# Patient Record
Sex: FEMALE | Race: WHITE
[De-identification: names, ages, dates, MRNs, and addresses within clinical notes are randomized per-mention and may not be internally consistent; named-entity substitution may affect disease eponyms.]

---

## 2019-01-28 ENCOUNTER — HOSPITAL ENCOUNTER (EMERGENCY)
Dept: HOSPITAL 25 - ED | Age: 34
Discharge: HOME | End: 2019-01-28
Payer: COMMERCIAL

## 2019-01-28 VITALS — DIASTOLIC BLOOD PRESSURE: 75 MMHG | SYSTOLIC BLOOD PRESSURE: 100 MMHG

## 2019-01-28 DIAGNOSIS — Z3A.16: ICD-10-CM

## 2019-01-28 DIAGNOSIS — R10.84: ICD-10-CM

## 2019-01-28 DIAGNOSIS — O23.42: Primary | ICD-10-CM

## 2019-01-28 LAB
ALBUMIN SERPL BCG-MCNC: 3.7 G/DL (ref 3.2–5.2)
ALBUMIN/GLOB SERPL: 1.3 {RATIO} (ref 1–3)
ALP SERPL-CCNC: 69 U/L (ref 34–104)
ALT SERPL W P-5'-P-CCNC: 17 U/L (ref 7–52)
ANION GAP SERPL CALC-SCNC: 8 MMOL/L (ref 2–11)
AST SERPL-CCNC: 19 U/L (ref 13–39)
BASOPHILS # BLD AUTO: 0.1 10^3/UL (ref 0–0.2)
BUN SERPL-MCNC: 9 MG/DL (ref 6–24)
BUN/CREAT SERPL: 14.5 (ref 8–20)
CALCIUM SERPL-MCNC: 9.2 MG/DL (ref 8.6–10.3)
CHLORIDE SERPL-SCNC: 104 MMOL/L (ref 101–111)
EOSINOPHIL # BLD AUTO: 0.1 10^3/UL (ref 0–0.6)
GLOBULIN SER CALC-MCNC: 2.8 G/DL (ref 2–4)
GLUCOSE SERPL-MCNC: 79 MG/DL (ref 70–100)
HCO3 SERPL-SCNC: 23 MMOL/L (ref 22–32)
HCT VFR BLD AUTO: 39 % (ref 35–47)
HGB BLD-MCNC: 12.8 G/DL (ref 12–16)
LYMPHOCYTES # BLD AUTO: 2.5 10^3/UL (ref 1–4.8)
MCH RBC QN AUTO: 29 PG (ref 27–31)
MCHC RBC AUTO-ENTMCNC: 33 G/DL (ref 31–36)
MCV RBC AUTO: 87 FL (ref 80–97)
MONOCYTES # BLD AUTO: 0.8 10^3/UL (ref 0–0.8)
NEUTROPHILS # BLD AUTO: 8.5 10^3/UL (ref 1.5–7.7)
NRBC # BLD AUTO: 0 10^3/UL
NRBC BLD QL AUTO: 0
PLATELET # BLD AUTO: 229 10^3/UL (ref 150–450)
POTASSIUM SERPL-SCNC: 3.5 MMOL/L (ref 3.5–5)
PROT SERPL-MCNC: 6.5 G/DL (ref 6.4–8.9)
RBC # BLD AUTO: 4.47 10^6/UL (ref 4–5.4)
RBC UR QL AUTO: (no result)
SODIUM SERPL-SCNC: 135 MMOL/L (ref 135–145)
VIT C UR QL: (no result)
WBC # BLD AUTO: 12 10^3/UL (ref 3.5–10.8)
WBC UR QL AUTO: (no result)

## 2019-01-28 PROCEDURE — 86140 C-REACTIVE PROTEIN: CPT

## 2019-01-28 PROCEDURE — 83690 ASSAY OF LIPASE: CPT

## 2019-01-28 PROCEDURE — 99283 EMERGENCY DEPT VISIT LOW MDM: CPT

## 2019-01-28 PROCEDURE — 85025 COMPLETE CBC W/AUTO DIFF WBC: CPT

## 2019-01-28 PROCEDURE — 76775 US EXAM ABDO BACK WALL LIM: CPT

## 2019-01-28 PROCEDURE — 76705 ECHO EXAM OF ABDOMEN: CPT

## 2019-01-28 PROCEDURE — 83605 ASSAY OF LACTIC ACID: CPT

## 2019-01-28 PROCEDURE — 76856 US EXAM PELVIC COMPLETE: CPT

## 2019-01-28 PROCEDURE — 81015 MICROSCOPIC EXAM OF URINE: CPT

## 2019-01-28 PROCEDURE — 36415 COLL VENOUS BLD VENIPUNCTURE: CPT

## 2019-01-28 PROCEDURE — 87086 URINE CULTURE/COLONY COUNT: CPT

## 2019-01-28 PROCEDURE — 80053 COMPREHEN METABOLIC PANEL: CPT

## 2019-01-28 PROCEDURE — 81003 URINALYSIS AUTO W/O SCOPE: CPT

## 2019-01-28 NOTE — ED
GI/ HPI





- HPI Summary


HPI Summary: 


Patient is a 32 y/o F presenting to ED with complaints of right flank pain and 

decreased frequency of urination onsetting yesterday. She denies hematuria, 

fever, and vaginal discharge. Patient is 16 weeks pregnant, she states that she 

has Hx of hydronephrosis when pregnant. She also has Hx of kidney stones, 

stents placed with previous pregnancies. This is her third time pregnant. 

Appendix is still present. On triage, pain is rated 7/10. Nothing is noted to 

aggravate/alleviate Sx. Home medications and allergies are reviewed. 








- History of Current Complaint


Chief Complaint: EDFlankPain


Time Seen by Provider: 01/28/19 20:32


Stated Complaint: 16WKS PREG/RIGHT FLANK PAIN


Hx Obtained From: Patient


Onset/Duration: Started Days Ago - yesterday, Still Present


Timing: Constant, Lasting Days - yesterday


Severity: Severe - 7/10


Current Severity: Severe - 7/10


Pain Intensity: 9


Location of Pain: Flank - right


Associated Signs and Symptoms: Positive: Flank Pain - RIGHT, Other: - POSITIVE 

- DECREASED FREQUENCY OF URINATION.  Negative: Discharge, Fever, Hematuria


Additional Signs & Symptoms: Negative: Vaginal Discharge


Aggravating Factor(s): Nothing


Alleviating Factor(s): Nothing





- Allergy/Home Medications


Allergies/Adverse Reactions: 


 Allergies











Allergy/AdvReac Type Severity Reaction Status Date / Time


 


No Known Allergies Allergy   Verified 01/28/19 15:34














PMH/Surg Hx/FS Hx/Imm Hx


 History: Reports: Hx Kidney Stones


Sensory History: Reports: Hx Contacts or Glasses


Opthamlomology History: Reports: Hx Contacts or Glasses


Infectious Disease History: No


Infectious Disease History: 


   Denies: Traveled Outside the [place] in Last 30 Days





- Family History


Known Family History: Positive: Diabetes - type 2 in parents 


   Negative: Hypertension





- Social History


Alcohol Use: Occasionally


Substance Use Type: Reports: None


Smoking Status (MU): Never Smoked Tobacco





Review of Systems


Negative: Fever


Positive: frequency - decreased , flank pain - right .  Negative: discharge - 

vaginal , hematuria


All Other Systems Reviewed And Are Negative: Yes





Physical Exam





- Summary


Physical Exam Summary: 


Appearance: Well appearing, no pain distress


Skin: warm, dry, reflects adequate perfusion


Head/face: normal


Eyes: EOMI, KIRIT


ENT: normal


Neck: supple, non-tender


Respiratory: CTA, breath sounds present


Cardiovascular: RRR, pulses symmetrical  


Abdomen: RLQ and RUQ tenderness, soft


Musculoskeletal: normal, strength/ROM intact


Neuro: normal, sensory motor intact, A&Ox3








Triage Information Reviewed: Yes


Vital Signs On Initial Exam: 


 Initial Vitals











Temp Pulse Resp BP Pulse Ox


 


 98.3 F   98   16   127/87   97 


 


 01/28/19 15:30  01/28/19 15:30  01/28/19 15:30  01/28/19 15:30  01/28/19 15:30











Vital Signs Reviewed: Yes





Diagnostics





- Vital Signs


 Vital Signs











  Temp Pulse Resp BP Pulse Ox


 


 01/28/19 19:10  98.3 F  93  20  113/77  99


 


 01/28/19 15:30  98.3 F  98  16  127/87  97














- Laboratory


Lab Results: 


 Lab Results











  01/28/19 01/28/19 01/28/19 Range/Units





  15:40 16:52 16:52 


 


WBC   12.0 H   (3.5-10.8)  10^3/ul


 


RBC   4.47   (4.00-5.40)  10^6/ul


 


Hgb   12.8   (12.0-16.0)  g/dl


 


Hct   39   (35-47)  %


 


MCV   87   (80-97)  fL


 


MCH   29   (27-31)  pg


 


MCHC   33   (31-36)  g/dl


 


RDW   14   (10.5-15)  %


 


Plt Count   229   (150-450)  10^3/ul


 


MPV   7.9   (7.4-10.4)  fL


 


Neut % (Auto)   71.2   %


 


Lymph % (Auto)   20.6   %


 


Mono % (Auto)   6.8   %


 


Eos % (Auto)   0.9   %


 


Baso % (Auto)   0.5   %


 


Absolute Neuts (auto)   8.5 H   (1.5-7.7)  10^3/ul


 


Absolute Lymphs (auto)   2.5   (1.0-4.8)  10^3/ul


 


Absolute Monos (auto)   0.8   (0-0.8)  10^3/ul


 


Absolute Eos (auto)   0.1   (0-0.6)  10^3/ul


 


Absolute Basos (auto)   0.1   (0-0.2)  10^3/ul


 


Absolute Nucleated RBC   0   10^3/ul


 


Nucleated RBC %   0   


 


Sodium    135  (135-145)  mmol/L


 


Potassium    3.5  (3.5-5.0)  mmol/L


 


Chloride    104  (101-111)  mmol/L


 


Carbon Dioxide    23  (22-32)  mmol/L


 


Anion Gap    8  (2-11)  mmol/L


 


BUN    9  (6-24)  mg/dL


 


Creatinine    0.62  (0.51-0.95)  mg/dL


 


Est GFR ( Amer)    133.3  (>60)  


 


Est GFR (Non-Af Amer)    110.2  (>60)  


 


BUN/Creatinine Ratio    14.5  (8-20)  


 


Glucose    79  ()  mg/dL


 


Lactic Acid     (0.5-2.0)  mmol/L


 


Calcium    9.2  (8.6-10.3)  mg/dL


 


Total Bilirubin    0.30  (0.2-1.0)  mg/dL


 


AST    19  (13-39)  U/L


 


ALT    17  (7-52)  U/L


 


Alkaline Phosphatase    69  ()  U/L


 


C-Reactive Protein    35.64 H  (<8.01)  mg/L


 


Total Protein    6.5  (6.4-8.9)  g/dL


 


Albumin    3.7  (3.2-5.2)  g/dL


 


Globulin    2.8  (2-4)  g/dL


 


Albumin/Globulin Ratio    1.3  (1-3)  


 


Lipase    37  (11.0-82.0)  U/L


 


Urine Color  Yellow    


 


Urine Appearance  Cloudy    


 


Urine pH  5.0    (5-9)  


 


Ur Specific Gravity  1.014    (1.010-1.030)  


 


Urine Protein  Negative    (Negative)  


 


Urine Ketones  Trace A    (Negative)  


 


Urine Blood  Negative    (Negative)  


 


Urine Nitrate  Negative    (Negative)  


 


Urine Bilirubin  Negative    (Negative)  


 


Urine Urobilinogen  Negative    (Negative)  


 


Ur Leukocyte Esterase  1+ A    (Negative)  


 


Urine WBC (Auto)  1+(6-10/hpf) A    (Absent)  


 


Urine RBC (Auto)  Trace(0-2/hpf)    (Absent)  


 


Ur Squamous Epith Cells  Present A    (Absent)  


 


Urine Bacteria  1+ A    (Absent)  


 


Urine Glucose  Negative    (Negative)  


 


Urine Ascorbic Acid  * A    (Negative)  














  01/28/19 Range/Units





  16:52 


 


WBC   (3.5-10.8)  10^3/ul


 


RBC   (4.00-5.40)  10^6/ul


 


Hgb   (12.0-16.0)  g/dl


 


Hct   (35-47)  %


 


MCV   (80-97)  fL


 


MCH   (27-31)  pg


 


MCHC   (31-36)  g/dl


 


RDW   (10.5-15)  %


 


Plt Count   (150-450)  10^3/ul


 


MPV   (7.4-10.4)  fL


 


Neut % (Auto)   %


 


Lymph % (Auto)   %


 


Mono % (Auto)   %


 


Eos % (Auto)   %


 


Baso % (Auto)   %


 


Absolute Neuts (auto)   (1.5-7.7)  10^3/ul


 


Absolute Lymphs (auto)   (1.0-4.8)  10^3/ul


 


Absolute Monos (auto)   (0-0.8)  10^3/ul


 


Absolute Eos (auto)   (0-0.6)  10^3/ul


 


Absolute Basos (auto)   (0-0.2)  10^3/ul


 


Absolute Nucleated RBC   10^3/ul


 


Nucleated RBC %   


 


Sodium   (135-145)  mmol/L


 


Potassium   (3.5-5.0)  mmol/L


 


Chloride   (101-111)  mmol/L


 


Carbon Dioxide   (22-32)  mmol/L


 


Anion Gap   (2-11)  mmol/L


 


BUN   (6-24)  mg/dL


 


Creatinine   (0.51-0.95)  mg/dL


 


Est GFR ( Amer)   (>60)  


 


Est GFR (Non-Af Amer)   (>60)  


 


BUN/Creatinine Ratio   (8-20)  


 


Glucose   ()  mg/dL


 


Lactic Acid  0.9  (0.5-2.0)  mmol/L


 


Calcium   (8.6-10.3)  mg/dL


 


Total Bilirubin   (0.2-1.0)  mg/dL


 


AST   (13-39)  U/L


 


ALT   (7-52)  U/L


 


Alkaline Phosphatase   ()  U/L


 


C-Reactive Protein   (<8.01)  mg/L


 


Total Protein   (6.4-8.9)  g/dL


 


Albumin   (3.2-5.2)  g/dL


 


Globulin   (2-4)  g/dL


 


Albumin/Globulin Ratio   (1-3)  


 


Lipase   (11.0-82.0)  U/L


 


Urine Color   


 


Urine Appearance   


 


Urine pH   (5-9)  


 


Ur Specific Gravity   (1.010-1.030)  


 


Urine Protein   (Negative)  


 


Urine Ketones   (Negative)  


 


Urine Blood   (Negative)  


 


Urine Nitrate   (Negative)  


 


Urine Bilirubin   (Negative)  


 


Urine Urobilinogen   (Negative)  


 


Ur Leukocyte Esterase   (Negative)  


 


Urine WBC (Auto)   (Absent)  


 


Urine RBC (Auto)   (Absent)  


 


Ur Squamous Epith Cells   (Absent)  


 


Urine Bacteria   (Absent)  


 


Urine Glucose   (Negative)  


 


Urine Ascorbic Acid   (Negative)  











Result Diagrams: 


 01/28/19 16:52





 01/28/19 16:52


Lab Statement: Any lab studies that have been ordered have been reviewed, and 

results considered in the medical decision making process.





- Ultrasound


  ** No standard instances


Ultrasound Interpretation Completed By: Radiologist


Summary of Ultrasound Findings: RENAL US IMPRESSION:  MILD RIGHT 

HYDRONEPHROSIS.  THIS REPORT WAS REVIEWED BY ED PHYSICIAN.





GIGU Course/Dx





- Course


Course Of Treatment: Patient is a 32 y/o F presenting to ED with complaints of 

right flank pain and decreased frequency of urination onsetting yesterday. She 

denies hematuria, fever, and vaginal discharge. Patient is 16 weeks pregnant, 

she states that she has Hx of hydronephrosis when pregnant. She also has Hx of 

kidney stones, stents placed with previous pregnancies. This is her third time 

pregnant. Appendix is still present.  On physical exam, RUQ and RLQ tenderness 

is noted.  RENAL US IMPRESSION:  MILD RIGHT HYDRONEPHROSIS.  Bloodwork and UA 

were obtained. Patient is signed out to Dr. Beverly pending US appendix and 

pelvis.





- Diagnoses


Differential Diagnoses - Female: Pregnancy, Urinary Tract Infection


Provider Diagnoses: 


 Abdominal pain, UTI (urinary tract infection), Pregnancy








Discharge





- Sign-Out/Discharge


Documenting (check all that apply): Sign-Out Patient


Signing out patient TO: Charlie Beverly


Receiving patient FROM: Cullen Valadez





- Discharge Plan


Condition: Stable


Referrals: 


Herman MATT,Brooks RUBIN [Primary Care Provider] - 





- Billing Disposition and Condition


Condition: STABLE





- Attestation Statements


Document Initiated by Miquelibe: Yes


Documenting Scribe: SUNSHINE CISSE 


Provider For Whom Jun is Documenting (Include Credential): CULLEN VALADEZ MD


Scribe Attestation: 


SUNSHINE QIU scribed for CULLEN VALADEZ MD on 01/28/19 at 2141. 


Scribe Documentation Reviewed: Yes


Provider Attestation: 


The documentation as recorded by the SUNSHINE beckett  accurately reflects 

the service I personally performed and the decisions made by me, CULLEN VALADEZ MD


Status of Scribe Document: Viewed

## 2019-01-28 NOTE — ED
Progress





- Progress Note


Progress Note: 


This patient was signed out from Dr. Tee to Dr. Beverly, pending dispo, 

awaiting US appendix and US pelvis.





US appendix: Appendix not visualized in right lower quadrant.


US pelvis: 1. Small hypoechoic focus noted adjacent to the inferior margin of 

the placenta in lower uterine segment. This may represent OLD periplacental 

hemorrhage. There is no evidence of acute placental abruption. 2. Single viable 

IUP, incompletely assessed.





This patient will be discharged with a dx of flank pain. Patient understands 

and agrees with this plan.





Course/Dx





- Course


Course Of Treatment: Patient is a 34 y/o F presenting to ED with complaints of 

right flank pain and decreased frequency of urination onsetting yesterday. She 

denies hematuria, fever, and vaginal discharge. Patient is 16 weeks pregnant, 

she states that she has Hx of hydronephrosis when pregnant. She also has Hx of 

kidney stones, stents placed with previous pregnancies. This is her third time 

pregnant. Appendix is still present.  On physical exam, RUQ and RLQ tenderness 

is noted.  RENAL US IMPRESSION:  MILD RIGHT HYDRONEPHROSIS.  Bloodwork and UA 

were obtained. Patient is signed out to Dr. Beverly pending US appendix and 

pelvis.





- Diagnoses


Provider Diagnoses: 


 Flank pain








Discharge





- Sign-Out/Discharge


Documenting (check all that apply): Patient Departure - discharge





- Discharge Plan


Condition: Stable


Disposition: HOME


Prescriptions: 


Nitrofurantoin Monohyd/M-Cryst [Macrobid 100 mg Capsule] 100 mg PO BID #14 cap


Patient Education Materials:  Flank Pain (ED)


Referrals: 


Brooks Sutton MD [Primary Care Provider] - 


Additional Instructions: 


The tests we did this evening look good. There is mild hydronephrosis on the 

right but it does not look bad enough to need a stent. You should contact your 

urologist though, since it may worsen as the pregnancy advances.





- Billing Disposition and Condition


Condition: STABLE


Disposition: Home





- Attestation Statements


Document Initiated by Jun: Yes


Documenting Scribe: Tima Graham


Provider For Whom Jun is Documenting (Include Credential): Charlie Beverly MD


Scribe Attestation: 


Tima QIU, scribed for Charlie Beverly MD on 01/29/19 at 0643. 


Scribe Documentation Reviewed: Yes


Provider Attestation: 


The documentation as recorded by the Tima beckett accurately reflects the 

service I personally performed and the decisions made by me, Charlie Beverly MD


Status of Scribe Document: Viewed

## 2019-06-14 ENCOUNTER — HOSPITAL ENCOUNTER (INPATIENT)
Dept: HOSPITAL 25 - MCHOBOUT | Age: 34
LOS: 2 days | Discharge: HOME | DRG: 540 | End: 2019-06-16
Attending: OBSTETRICS & GYNECOLOGY | Admitting: MIDWIFE
Payer: COMMERCIAL

## 2019-06-14 DIAGNOSIS — N80.1: ICD-10-CM

## 2019-06-14 DIAGNOSIS — Z3A.36: ICD-10-CM

## 2019-06-14 DIAGNOSIS — Z87.442: ICD-10-CM

## 2019-06-14 DIAGNOSIS — I86.2: ICD-10-CM

## 2019-06-14 LAB
BASOPHILS # BLD AUTO: 0 10^3/UL (ref 0–0.2)
EOSINOPHIL # BLD AUTO: 0.1 10^3/UL (ref 0–0.6)
HCT VFR BLD AUTO: 35 % (ref 35–47)
HGB BLD-MCNC: 12 G/DL (ref 12–16)
LYMPHOCYTES # BLD AUTO: 3.2 10^3/UL (ref 1–4.8)
MCH RBC QN AUTO: 30 PG (ref 27–31)
MCHC RBC AUTO-ENTMCNC: 34 G/DL (ref 31–36)
MCV RBC AUTO: 87 FL (ref 80–97)
MONOCYTES # BLD AUTO: 1 10^3/UL (ref 0–0.8)
NEUTROPHILS # BLD AUTO: 5.7 10^3/UL (ref 1.5–7.7)
NRBC # BLD AUTO: 0 10^3/UL
NRBC BLD QL AUTO: 0
PLATELET # BLD AUTO: 233 10^3/UL (ref 150–450)
RBC # BLD AUTO: 4.05 10^6 /UL (ref 3.7–4.87)
WBC # BLD AUTO: 10 10^3/UL (ref 3.5–10.8)

## 2019-06-14 PROCEDURE — 4A1HXCZ MONITORING OF PRODUCTS OF CONCEPTION, CARDIAC RATE, EXTERNAL APPROACH: ICD-10-PCS | Performed by: OBSTETRICS & GYNECOLOGY

## 2019-06-14 PROCEDURE — 86870 RBC ANTIBODY IDENTIFICATION: CPT

## 2019-06-14 PROCEDURE — 86900 BLOOD TYPING SEROLOGIC ABO: CPT

## 2019-06-14 PROCEDURE — 88307 TISSUE EXAM BY PATHOLOGIST: CPT

## 2019-06-14 PROCEDURE — 85461 HEMOGLOBIN FETAL: CPT

## 2019-06-14 PROCEDURE — 86880 COOMBS TEST DIRECT: CPT

## 2019-06-14 PROCEDURE — 86901 BLOOD TYPING SEROLOGIC RH(D): CPT

## 2019-06-14 PROCEDURE — 85025 COMPLETE CBC W/AUTO DIFF WBC: CPT

## 2019-06-14 PROCEDURE — 86850 RBC ANTIBODY SCREEN: CPT

## 2019-06-14 PROCEDURE — 90707 MMR VACCINE SC: CPT

## 2019-06-14 PROCEDURE — 36415 COLL VENOUS BLD VENIPUNCTURE: CPT

## 2019-06-14 RX ADMIN — KETOROLAC TROMETHAMINE PRN MG: 30 INJECTION, SOLUTION INTRAMUSCULAR; INTRAVENOUS at 14:09

## 2019-06-14 RX ADMIN — SIMETHICONE CHEW TAB 80 MG SCH MG: 80 TABLET ORAL at 20:08

## 2019-06-14 RX ADMIN — DOCUSATE SODIUM SCH MG: 100 CAPSULE, LIQUID FILLED ORAL at 13:07

## 2019-06-14 RX ADMIN — SIMETHICONE CHEW TAB 80 MG SCH MG: 80 TABLET ORAL at 08:27

## 2019-06-14 RX ADMIN — KETOROLAC TROMETHAMINE PRN MG: 30 INJECTION, SOLUTION INTRAMUSCULAR; INTRAVENOUS at 20:09

## 2019-06-14 RX ADMIN — SODIUM CHLORIDE, SODIUM LACTATE, POTASSIUM CHLORIDE, AND CALCIUM CHLORIDE ONE MLS/HR: 600; 310; 30; 20 INJECTION, SOLUTION INTRAVENOUS at 00:50

## 2019-06-14 RX ADMIN — KETOROLAC TROMETHAMINE PRN MG: 30 INJECTION, SOLUTION INTRAMUSCULAR; INTRAVENOUS at 08:27

## 2019-06-14 RX ADMIN — SIMETHICONE CHEW TAB 80 MG SCH MG: 80 TABLET ORAL at 13:07

## 2019-06-14 RX ADMIN — OXYCODONE HYDROCHLORIDE AND ACETAMINOPHEN PRN TAB: 5; 325 TABLET ORAL at 23:09

## 2019-06-14 RX ADMIN — KETOROLAC TROMETHAMINE PRN MG: 30 INJECTION, SOLUTION INTRAMUSCULAR; INTRAVENOUS at 02:32

## 2019-06-14 RX ADMIN — DOCUSATE SODIUM SCH MG: 100 CAPSULE, LIQUID FILLED ORAL at 08:27

## 2019-06-14 RX ADMIN — DOCUSATE SODIUM SCH MG: 100 CAPSULE, LIQUID FILLED ORAL at 20:08

## 2019-06-14 RX ADMIN — SIMETHICONE CHEW TAB 80 MG SCH MG: 80 TABLET ORAL at 18:11

## 2019-06-14 RX ADMIN — SODIUM CHLORIDE, SODIUM LACTATE, POTASSIUM CHLORIDE, AND CALCIUM CHLORIDE ONE MLS/HR: 600; 310; 30; 20 INJECTION, SOLUTION INTRAVENOUS at 00:15

## 2019-06-14 NOTE — PN
Progress Note





- Progress Note


Date of Service: 19


Note: 





35 yo  at 36 weeks with pprom and a breech presentation . pt and 

 and I discussed  section for breech presentation. discussed 

risks /benefits /alternatives and indications / . questions were answered. 

Expected recovery . effect on future pregnancy.





Silvio Poon MD

## 2019-06-14 NOTE — OP
DATE OF OPERATION:  19 - ROOM #106

 

DATE OF BIRTH:  [birth year]

 

SURGEON:  Daniel Poon MD

 

ASSISTANT:  Neha Lee CNM

 

ANESTHESIA:  Spinal.

 

PRE-OP DIAGNOSES:  Breech presentation, 36 weeks,  premature rupture of 
membranes, and  labor.

 

POST-OP DIAGNOSES:  Breech presentation, 36 weeks,  premature rupture of 
membranes, and  labor.

 

OPERATIVE PROCEDURE:  Low transverse  section.

 

ESTIMATED BLOOD LOSS:  800 cc.

 

SPECIMEN:  Includes placenta.

 

FLUIDS:  Include 3000 cc of crystalloid.

 

FINDINGS:  This is a 34-year-old  3, para 2, who presented at 36 weeks 
with spontaneous rupture of membranes and labor.  She was found to be in a 
breech presentation by ultrasound.  The risks, benefits, alternatives, 
indications of  section were discussed and the patient proceeded based 
on my recommendations.  At that time, she had a viable female, Apgars 9 and 9, 
weight was 7 pounds 2 ounces.  There was nuchal cord x1.  There were normal 
fallopian tubes. The ovaries contained residual reaction consistent with some 
endometriosis and pelvic varicosities.

 

DESCRIPTION OF PROCEDURE: The patient identified, procedure identified as a low 
transverse  section. The patient was taken to the operating room, 
prepped and draped in the usual fashion in the left lateral recumbent position 
under spinal anesthesia.  A Pfannenstiel incision was made in the abdomen and 
carried down through fat, fascia, and peritoneum.  A transverse incision was 
made in the lower uterine segment, extended laterally using blunt dissection.  
The above infant was delivered through the incision using breech extraction 
manner without difficulty. Cord was doubly clamped and cut and the infant was 
handed to the awaiting pediatrician.  Cord blood was obtained.  Placenta 
delivered spontaneously.  Uterus was wiped out with a lap sponge.  The uterine 
incision was then closed using 0- Polysorb in a running fashion.  A second 
layer was used to imbricate the first layer.  Good hemostasis was verified.  
The uterus was back into the abdominal cavity.  The gutters were wiped out with 
a wet lap sponge.  The good hemostasis was verified and the peritoneum was 
closed using 3-0 Polysorb in a running fashion. Hemostasis was achieved in the 
subrectus layers.  The fascia was closed using 0-Polysorb in a running fashion.
  Hemostasis was achieved in the subcu.  Copious irrigation was utilized and 
suctioned out.  The deep Jeannine's fascia and adipose tissues were 
reapproximated using 3-0 Polysorb in a simple fashion.  The skin was closed 
with 4-0 Monocryl in a subcuticular fashion.  Steri-Strips and Mastisol were 
used to close the skin as well.  All sponge and instrument counts were correct 
and the patient returned to the recovery room in stable condition.

 

 [id number]/[facility] #: [id number]

[facility]

## 2019-06-14 NOTE — HP
General Information





- Reason for Visit


SROM at 36 wks








- General Information


Maternal Age: 34


Grav: 3


Para: 2


SAB: 0


IEA: 0





Estimated Due Date: 07/10/19


Determined By: LMP


Gestational Age in Weeks/Days: 36w 1d


Maternal Blood Type and Rh: A Negative





- Results this Pregnancy


Serology/RPR Result: Non-Reactive


Rubella Result: Non-Immune


HBsAg Result: Negative


HIV Result: Negative





Past Medical History


Delivery History: Hx Uncomplicated Vaginal Delivery


Past Medical History Comment: 


Kidney stone with stent


migraine


pseudogout


LEEP 


Past Surgical History Comment: 


cystoscopy with stent placement  x 3,  and 





Pertinent Family History: See  Records





- Antepartal Records


Antepartal Records: Reviewed, Pregnancy Complicated by: - age 34, normal NIPT





Review of Systems


Constitutional: Comfortable


CV Complaint: No


Respiratory: Shortness of Breath: No - recent URI


Gastrointestinal: Nausea


Genitourinary: Leaking Fluid


Musculoskeletal: Contractions


Neurological: No Headache


Fetal Movement: Normal





Exam


Allergies/Adverse Reactions: 


Allergies





No Known Allergies Allergy (Verified 19 15:34)


 











- Measurements


Height: 5 ft 6 in


Weight: 208 lb


Body Mass Index (BMI): 33.5


Pre-Pregnancy Weight: 205 lb





- Exam


Breast: - - soft, no masses


Extremities: No Edema


Heart: Normal Rhythm/Heart Sounds


HEENT: No Significant Findings


Lungs: Clear Bilaterally


Reflexes: DTR 2+


Thyroid: No Thyromegaly





- Abdominal Exam


Abdomen Exam: Non-Tender





- Ultrasound/Biophysical Profile


Ultrasound Status: Bedside Exam


Ultrasound Findings: 


breech presentation








Targeted Exam Findings


Estimated Fetal Weight: 7.5 lbs


Cervical Exam: 4cm


Effacement: 80%


Station: -3


Presenting Part: Breech


Membrane Status: SROM


Amniotic Fluid Evaluation: Gross Rupture





EFM Findings





- External Fetal Monitor Findings


Baseline Fetal Heart Rate: 160


External Fetal Monitor Findings: Accelerations Present, No Pattern of Variable 

or Late Decelerations, Variability Moderate


External Monitor Findings Comment: category 1


Contractions: Mild - every 5 min





Assessment/Plan





- Assessment


gross rupture, breech in labor








- Obstetrical Risk Factors


Obstetrical Risk Factors: GBS Unknown - drawn 19





- Plan


Plan: Expedite C/S Delivery





- Date/Time of Admission


Date of Admission: 19


Time of Admission: 00:30

## 2019-06-15 LAB
BASOPHILS # BLD AUTO: 0 10^3/UL (ref 0–0.2)
EOSINOPHIL # BLD AUTO: 0.2 10^3/UL (ref 0–0.6)
HCT VFR BLD AUTO: 29 % (ref 35–47)
HGB BLD-MCNC: 9.7 G/DL (ref 12–16)
LYMPHOCYTES # BLD AUTO: 1.5 10^3/UL (ref 1–4.8)
MCH RBC QN AUTO: 29 PG (ref 27–31)
MCHC RBC AUTO-ENTMCNC: 34 G/DL (ref 31–36)
MCV RBC AUTO: 87 FL (ref 80–97)
MONOCYTES # BLD AUTO: 0.7 10^3/UL (ref 0–0.8)
NEUTROPHILS # BLD AUTO: 5.5 10^3/UL (ref 1.5–7.7)
NRBC # BLD AUTO: 0 10^3/UL
NRBC BLD QL AUTO: 0.1
PLATELET # BLD AUTO: 164 10^3/UL (ref 150–450)
RBC # BLD AUTO: 3.32 10^6 /UL (ref 3.7–4.87)
WBC # BLD AUTO: 8 10^3/UL (ref 3.5–10.8)

## 2019-06-15 RX ADMIN — IBUPROFEN PRN MG: 600 TABLET, FILM COATED ORAL at 23:49

## 2019-06-15 RX ADMIN — DOCUSATE SODIUM SCH MG: 100 CAPSULE, LIQUID FILLED ORAL at 08:40

## 2019-06-15 RX ADMIN — SIMETHICONE CHEW TAB 80 MG SCH MG: 80 TABLET ORAL at 08:40

## 2019-06-15 RX ADMIN — SIMETHICONE CHEW TAB 80 MG SCH MG: 80 TABLET ORAL at 13:09

## 2019-06-15 RX ADMIN — IBUPROFEN PRN MG: 600 TABLET, FILM COATED ORAL at 08:41

## 2019-06-15 RX ADMIN — Medication SCH MG: at 20:41

## 2019-06-15 RX ADMIN — OXYCODONE HYDROCHLORIDE AND ACETAMINOPHEN PRN TAB: 5; 325 TABLET ORAL at 23:49

## 2019-06-15 RX ADMIN — DOCUSATE SODIUM SCH MG: 100 CAPSULE, LIQUID FILLED ORAL at 20:41

## 2019-06-15 RX ADMIN — OXYCODONE HYDROCHLORIDE AND ACETAMINOPHEN PRN TAB: 5; 325 TABLET ORAL at 08:41

## 2019-06-15 RX ADMIN — Medication SCH MG: at 08:40

## 2019-06-15 RX ADMIN — DOCUSATE SODIUM SCH MG: 100 CAPSULE, LIQUID FILLED ORAL at 13:09

## 2019-06-15 RX ADMIN — SIMETHICONE CHEW TAB 80 MG SCH MG: 80 TABLET ORAL at 18:26

## 2019-06-15 RX ADMIN — IBUPROFEN PRN MG: 600 TABLET, FILM COATED ORAL at 16:23

## 2019-06-15 RX ADMIN — SIMETHICONE CHEW TAB 80 MG SCH MG: 80 TABLET ORAL at 20:41

## 2019-06-15 RX ADMIN — ACETAMINOPHEN PRN MG: 325 TABLET ORAL at 13:09

## 2019-06-15 RX ADMIN — IBUPROFEN PRN MG: 600 TABLET, FILM COATED ORAL at 02:14

## 2019-06-16 VITALS — SYSTOLIC BLOOD PRESSURE: 113 MMHG | DIASTOLIC BLOOD PRESSURE: 63 MMHG

## 2019-06-16 RX ADMIN — Medication SCH MG: at 08:44

## 2019-06-16 RX ADMIN — SIMETHICONE CHEW TAB 80 MG SCH MG: 80 TABLET ORAL at 08:44

## 2019-06-16 RX ADMIN — ACETAMINOPHEN PRN MG: 325 TABLET ORAL at 11:30

## 2019-06-16 RX ADMIN — SIMETHICONE CHEW TAB 80 MG SCH MG: 80 TABLET ORAL at 11:29

## 2019-06-16 RX ADMIN — IBUPROFEN PRN MG: 600 TABLET, FILM COATED ORAL at 08:45

## 2019-06-16 RX ADMIN — DOCUSATE SODIUM SCH MG: 100 CAPSULE, LIQUID FILLED ORAL at 08:44

## 2019-12-30 ENCOUNTER — HOSPITAL ENCOUNTER (EMERGENCY)
Dept: HOSPITAL 25 - UCEAST | Age: 34
Discharge: HOME | End: 2019-12-30
Payer: COMMERCIAL

## 2019-12-30 VITALS — SYSTOLIC BLOOD PRESSURE: 116 MMHG | DIASTOLIC BLOOD PRESSURE: 54 MMHG

## 2019-12-30 DIAGNOSIS — E11.9: ICD-10-CM

## 2019-12-30 DIAGNOSIS — Z87.891: ICD-10-CM

## 2019-12-30 DIAGNOSIS — J32.9: Primary | ICD-10-CM

## 2019-12-30 PROCEDURE — G0463 HOSPITAL OUTPT CLINIC VISIT: HCPCS

## 2019-12-30 PROCEDURE — 99212 OFFICE O/P EST SF 10 MIN: CPT

## 2019-12-30 NOTE — UC
Throat Pain/Nasal Felice HPI





- HPI Summary


HPI Summary: 





33 yo female presents with sinus symptoms. She tells me that over the last 

month has had sinus pain and pressure. States that she has a history of sinus 

infections and this feels the same. Today started getting a migraine, which she 

has a history of - thinks this was brought on by the sinus issues. She has been 

taking tylenol and flonase OTC with little relief. She is currently 

breastfeeding. Denies fever, sore throat, cough, SOB, chest pain. 





- History of Current Complaint


Chief Complaint: UCGeneralIllness


Stated Complaint: SINUS PROBLEM


Time Seen by Provider: 12/30/19 15:38


Hx Obtained From: Patient


Onset/Duration: Gradual Onset


Severity: Moderate


Pain Intensity: 8


Pain Scale Used: 0-10 Numeric





- Allergies/Home Medications


Allergies/Adverse Reactions: 


 Allergies











Allergy/AdvReac Type Severity Reaction Status Date / Time


 


No Known Allergies Allergy   Verified 12/30/19 15:26














PMH/Surg Hx/FS Hx/Imm Hx


Neurological History: Migraine





- Surgical History


Surgical History: Yes


Surgery Procedure, Year, and Place: lithotripsy, c-sec





- Family History


Known Family History: Positive: Diabetes - type 2 in parents 


   Negative: Hypertension





- Social History


Occupation: Employed Full-time


Lives: With Family


Alcohol Use: None


Substance Use Type: None


Smoking Status (MU): Former Smoker


Type: Cigarettes





- Immunization History


Most Recent Influenza Vaccination: fall 2018


Most Recent Pneumonia Vaccination: na





Review of Systems


All Other Systems Reviewed And Are Negative: No


Constitutional: Positive: Negative


Skin: Positive: Negative


Eyes: Positive: Negative


ENT: Positive: Sinus Congestion, Sinus Pain/Tenderness


Respiratory: Positive: Negative


Cardiovascular: Positive: Negative


Gastrointestinal: Positive: Negative


Neurovascular: Positive: Negative


Neurological: Positive: Headache


Psychological: Positive: Negative





Physical Exam





- Summary


Physical Exam Summary: 





GENERAL: NAD. WDWN. No pain distress.


SKIN: No rashes, sores, lesions, or open wounds.


HEENT:


            Head: AT/NC


            Eyes:  EOM intact. Conjunctiva clear without inflammation or 

discharge.


            Ears: Hearing grossly normal. TMs intact, no bulging, erythema, or 

edema. 


            Nose: Nasal mucosa mildly swollen and erythematous with yellow/

clear discharge LEFT > RIGHT. TTP maxillary and frontal sinus LEFT > RIGHT. 

Positive post nasal drip


            Throat: Posterior oropharynx without exudates, erythema, or 

tonsillar enlargement.  Uvula midline.


NECK: Supple. Nontender. No lymphadenopathy. 


CHEST:  CTAB. No r/r/w. No accessory muscle use. Breathing comfortably and in 

no distress.


CV:  RRR. Pulses intact. 


NEURO: Alert.


PSYCH: Age appropriate behavior.


Triage Information Reviewed: Yes


Vital Signs: 


 Initial Vital Signs











Temp  97.8 F   12/30/19 15:22


 


Pulse  81   12/30/19 15:22


 


Resp  16   12/30/19 15:22


 


BP  116/54   12/30/19 15:22


 


Pulse Ox  98   12/30/19 15:22











Vital Signs Reviewed: Yes





Throat Pain/Nasal Course/Dx





- Course


Course Of Treatment: 





Sinusitis





- Differential Dx/Diagnosis


Provider Diagnosis: 


 Sinusitis








Discharge ED





- Sign-Out/Discharge


Documenting (check all that apply): Patient Departure


All imaging exams completed and their final reports reviewed: No Studies





- Discharge Plan


Condition: Stable


Disposition: HOME


Prescriptions: 


Amoxicillin PO (*) [Amoxicillin 875 MG (*)] 875 mg PO BID #14 tab


Patient Education Materials:  Sinusitis (ED)


Referrals: 


Herman MATT,Brooks RUBIN [Primary Care Provider] - 


Additional Instructions: 


If you develop a fever, shortness of breath, chest pain, new or worsening 

symptoms - please call your PCP or go to the ED immediately.


 








- Billing Disposition and Condition


Condition: STABLE


Disposition: Home